# Patient Record
Sex: FEMALE
[De-identification: names, ages, dates, MRNs, and addresses within clinical notes are randomized per-mention and may not be internally consistent; named-entity substitution may affect disease eponyms.]

---

## 2017-03-15 ENCOUNTER — CHARTING TRANS (OUTPATIENT)
Dept: OTHER | Age: 52
End: 2017-03-15

## 2018-02-01 PROCEDURE — 84146 ASSAY OF PROLACTIN: CPT | Performed by: INTERNAL MEDICINE

## 2018-02-02 PROBLEM — E04.1 THYROID NODULE: Status: ACTIVE | Noted: 2018-02-02

## 2018-02-02 PROBLEM — G43.109 MIGRAINE WITH AURA AND WITHOUT STATUS MIGRAINOSUS, NOT INTRACTABLE: Status: ACTIVE | Noted: 2018-02-02

## 2018-11-05 VITALS
DIASTOLIC BLOOD PRESSURE: 80 MMHG | HEART RATE: 64 BPM | SYSTOLIC BLOOD PRESSURE: 142 MMHG | RESPIRATION RATE: 16 BRPM | WEIGHT: 108.02 LBS | HEIGHT: 62 IN | TEMPERATURE: 97.1 F | BODY MASS INDEX: 19.88 KG/M2

## 2020-12-10 ENCOUNTER — APPOINTMENT (OUTPATIENT)
Dept: MRI IMAGING | Facility: HOSPITAL | Age: 55
End: 2020-12-10
Attending: EMERGENCY MEDICINE
Payer: COMMERCIAL

## 2020-12-10 ENCOUNTER — HOSPITAL ENCOUNTER (EMERGENCY)
Facility: HOSPITAL | Age: 55
Discharge: HOME | End: 2020-12-10
Attending: EMERGENCY MEDICINE
Payer: COMMERCIAL

## 2020-12-10 VITALS
WEIGHT: 101.44 LBS | DIASTOLIC BLOOD PRESSURE: 70 MMHG | HEIGHT: 61 IN | HEART RATE: 64 BPM | SYSTOLIC BLOOD PRESSURE: 126 MMHG | RESPIRATION RATE: 16 BRPM | OXYGEN SATURATION: 100 % | BODY MASS INDEX: 19.15 KG/M2 | TEMPERATURE: 98 F

## 2020-12-10 DIAGNOSIS — G43.109 MIGRAINE WITH AURA AND WITHOUT STATUS MIGRAINOSUS, NOT INTRACTABLE: ICD-10-CM

## 2020-12-10 DIAGNOSIS — H53.9 VISION CHANGES: Primary | ICD-10-CM

## 2020-12-10 PROCEDURE — 99285 EMERGENCY DEPT VISIT HI MDM: CPT

## 2020-12-10 PROCEDURE — 70553 MRI BRAIN STEM W/O & W/DYE: CPT | Performed by: EMERGENCY MEDICINE

## 2020-12-10 PROCEDURE — 93010 ELECTROCARDIOGRAM REPORT: CPT

## 2020-12-10 PROCEDURE — 80053 COMPREHEN METABOLIC PANEL: CPT | Performed by: EMERGENCY MEDICINE

## 2020-12-10 PROCEDURE — 70549 MR ANGIOGRAPH NECK W/O&W/DYE: CPT | Performed by: EMERGENCY MEDICINE

## 2020-12-10 PROCEDURE — 99284 EMERGENCY DEPT VISIT MOD MDM: CPT

## 2020-12-10 PROCEDURE — A9575 INJ GADOTERATE MEGLUMI 0.1ML: HCPCS | Performed by: EMERGENCY MEDICINE

## 2020-12-10 PROCEDURE — 93005 ELECTROCARDIOGRAM TRACING: CPT

## 2020-12-10 PROCEDURE — 70546 MR ANGIOGRAPH HEAD W/O&W/DYE: CPT | Performed by: EMERGENCY MEDICINE

## 2020-12-10 PROCEDURE — 85025 COMPLETE CBC W/AUTO DIFF WBC: CPT | Performed by: EMERGENCY MEDICINE

## 2020-12-10 PROCEDURE — 36415 COLL VENOUS BLD VENIPUNCTURE: CPT

## 2020-12-10 RX ORDER — ASPIRIN 325 MG
325 TABLET ORAL ONCE
COMMUNITY
End: 2020-12-21

## 2020-12-10 NOTE — ED PROVIDER NOTES
Patient Seen in: [facility] Emergency Department      History   Patient presents with:  Headache  Blurred Vision    Stated Complaint: HA, blurred vision. Hypertensive yesterday.      HPI    Patient is pleasant 61-year-old woman coming for evaluation Physical Exam  Vitals signs and nursing note reviewed. Constitutional:       General: She is not in acute distress. Appearance: She is well-developed. She is not toxic-appearing. HENT:      Head: Normocephalic and atraumatic.    Eyes:      G results for these tests on the individual orders. CBC W/ DIFFERENTIAL     EKG    Rate, intervals and axes as noted on EKG Report.   Rate: 61  Rhythm: Sinus Rhythm  Reading: normal ekg                 MRI BRAIN MRA HEAD+MRA NECK (ALL W+WO) (CPT=70553/06776 internal carotid arteries to the     termini. Both vertebral arteries are patent. The left is slightly dominant in     caliber. There is smooth tapering. The basilar artery is patent with     smooth tapering.            No focal stenosis or dis Discharge Medication List

## 2020-12-21 ENCOUNTER — OFFICE VISIT (OUTPATIENT)
Dept: NEUROLOGY | Facility: CLINIC | Age: 55
End: 2020-12-21
Payer: COMMERCIAL

## 2020-12-21 VITALS
SYSTOLIC BLOOD PRESSURE: 118 MMHG | HEIGHT: 61 IN | BODY MASS INDEX: 19.07 KG/M2 | WEIGHT: 101 LBS | HEART RATE: 64 BPM | DIASTOLIC BLOOD PRESSURE: 68 MMHG | RESPIRATION RATE: 16 BRPM

## 2020-12-21 DIAGNOSIS — G43.709 CHRONIC MIGRAINE W/O AURA, NOT INTRACTABLE, W/O STAT MIGR: Primary | ICD-10-CM

## 2020-12-21 PROCEDURE — 3078F DIAST BP <80 MM HG: CPT | Performed by: OTHER

## 2020-12-21 PROCEDURE — 3074F SYST BP LT 130 MM HG: CPT | Performed by: OTHER

## 2020-12-21 PROCEDURE — 99204 OFFICE O/P NEW MOD 45 MIN: CPT | Performed by: OTHER

## 2020-12-21 PROCEDURE — 1111F DSCHRG MED/CURRENT MED MERGE: CPT | Performed by: OTHER

## 2020-12-21 PROCEDURE — 3008F BODY MASS INDEX DOCD: CPT | Performed by: OTHER

## 2020-12-21 RX ORDER — SUMATRIPTAN 50 MG/1
TABLET, FILM COATED ORAL
Qty: 9 TABLET | Refills: 0 | Status: SHIPPED | OUTPATIENT
Start: 2020-12-21 | End: 2021-01-18

## 2020-12-21 NOTE — H&P
[facility] New Patient / Consult Visit    Sean Carrillo is a 50year old female. Referring MD: No ref.  provider found    Patient presents with:  Hospital F/U: Vision issues and headaches      HPI:    Tana • SUMAtriptan Succinate 50 MG Oral Tab Use at onset; repeat once after 2 hours-ONLY 2 IN 24 HR MAX. This is a 30 day supply. 9 tablet 0          ROS:   A comprehensive 10 point review of systems was completed.   Pertinent positives and negatives noted in normal    Sensory:  Pin is normal  Vibration is normal  Proprioception is normal  Romberg is absent    Coordination:  Finger to nose normal bilaterally  Rapid alternating movements normal bilaterally  Heel to shin is normal bilaterally    DTRs:   2+, symme neck MRA. 4. Continued clinical correlation recommended.       IMPRESSION AND PLAN:   Noemi Medina is a 50year old female with past medical history significant for hyperlipidemia, as well as migraines, who presents for evaluation of headaches, in th Succinate 50 MG Oral Tab       As noted above     Return in about 3 months (around 3/21/2021).     Copy of note was sent to PCP    40 total minutes spent with patient >50% of visit was spent in counseling and coordination of care, including discussion of mi

## 2021-01-15 DIAGNOSIS — G43.709 CHRONIC MIGRAINE W/O AURA, NOT INTRACTABLE, W/O STAT MIGR: ICD-10-CM

## 2021-01-15 NOTE — TELEPHONE ENCOUNTER
Medication: imitrex    Date of last refill: 12/21/20  [place]    Last office visit: 12/21/20  Due back to clinic per last office note:  3 months  Date next office visit scheduled:    Future Appointments   Date Time Pr

## 2021-01-18 RX ORDER — SUMATRIPTAN 50 MG/1
TABLET, FILM COATED ORAL
Qty: 9 TABLET | Refills: 0 | Status: SHIPPED | OUTPATIENT
Start: 2021-01-18 | End: 2021-02-11

## 2021-01-18 RX ORDER — SUMATRIPTAN 50 MG/1
TABLET, FILM COATED ORAL
Qty: 9 TABLET | Refills: 0 | OUTPATIENT
Start: 2021-01-18

## 2021-02-11 DIAGNOSIS — G43.709 CHRONIC MIGRAINE W/O AURA, NOT INTRACTABLE, W/O STAT MIGR: ICD-10-CM

## 2021-02-11 RX ORDER — SUMATRIPTAN 50 MG/1
TABLET, FILM COATED ORAL
Qty: 9 TABLET | Refills: 2 | Status: SHIPPED | OUTPATIENT
Start: 2021-02-11 | End: 2021-03-25

## 2021-02-11 NOTE — TELEPHONE ENCOUNTER
[facility] refill    Send to:  [facility] [facility], pt has questions about usage and amounts taken each week.   Call pt

## 2021-02-11 NOTE — TELEPHONE ENCOUNTER
Medication: Imitrex    Date of last refill: 1/18/2021 (#9/0)  [place]    Last office visit: 12/20/2020  Due back to clinic per last office note:  RTC in 3 months  Date next office visit scheduled: therapy to maximize effectiveness, and risk of medication overuse headache.     Patient was additionally advised to keep a headache diary, detailing migraine triggers, alleviating factors, response to medication, as well as frequency/severity and type of h

## 2021-03-17 ENCOUNTER — TELEPHONE (OUTPATIENT)
Dept: NEUROLOGY | Facility: CLINIC | Age: 56
End: 2021-03-17

## 2021-03-25 ENCOUNTER — OFFICE VISIT (OUTPATIENT)
Dept: NEUROLOGY | Facility: CLINIC | Age: 56
End: 2021-03-25
Payer: COMMERCIAL

## 2021-03-25 VITALS
SYSTOLIC BLOOD PRESSURE: 102 MMHG | HEART RATE: 72 BPM | DIASTOLIC BLOOD PRESSURE: 58 MMHG | BODY MASS INDEX: 20 KG/M2 | RESPIRATION RATE: 16 BRPM | WEIGHT: 105 LBS

## 2021-03-25 DIAGNOSIS — G43.709 CHRONIC MIGRAINE W/O AURA, NOT INTRACTABLE, W/O STAT MIGR: ICD-10-CM

## 2021-03-25 PROCEDURE — 3074F SYST BP LT 130 MM HG: CPT | Performed by: OTHER

## 2021-03-25 PROCEDURE — 99214 OFFICE O/P EST MOD 30 MIN: CPT | Performed by: OTHER

## 2021-03-25 PROCEDURE — 3078F DIAST BP <80 MM HG: CPT | Performed by: OTHER

## 2021-03-25 RX ORDER — TOPIRAMATE 25 MG/1
TABLET ORAL
Qty: 120 TABLET | Refills: 2 | Status: SHIPPED | OUTPATIENT
Start: 2021-03-25 | End: 2021-06-04 | Stop reason: DRUGHIGH

## 2021-03-25 RX ORDER — SUMATRIPTAN 50 MG/1
TABLET, FILM COATED ORAL
Qty: 9 TABLET | Refills: 2 | Status: SHIPPED | OUTPATIENT
Start: 2021-03-25 | End: 2021-06-04

## 2021-03-25 NOTE — PROGRESS NOTES
Pt here for migraine follow up. Pt feels she has been getting migraines more frequently, but abortive medication works well.

## 2021-03-25 NOTE — PROGRESS NOTES
[facility] Progress Note    HPI  Patient presents with:  Migraine      As per my initial H&P from 12/21/2020  \" Sean Carrillo is a 50year old, who presents for evaluation of headache and vision issues.       Patient states she has taking 10-11 times a month; denies any side effects on the medication. Past Medical History:   Diagnosis Date   • Hyperlipidemia    • Migraine    • Thyroid nodule      History reviewed. No pertinent surgical history.   Family History   Problem Relation 2  •  topiramate 25 MG Oral Tab, start at 25 mg nightly x1 week, then increase to 50 mg nightly x1 week, then 25 mg AM / 50 mg PM x1 week, then 50 mg bid, Disp: 120 tablet, Rfl: 2    Review of Systems:  No chest pain or palpitations; otherwise as noted in mastoid air cells have expected signal.       There is a typical take up pattern of the great vessels from the transverse arch.  The origins appear widely patent.  There is smooth tapering of the common carotid arteries to the bifurcations.  The bifurcatio topiramate (Topamax) as follows:  start at 25 mg nightly x1 week, then increase to 50 mg nightly x1 week, then 25 mg AM / 50 mg PM x1 week, then 50 mg bid; advised of potential side effects, including but not limited to, cognitive slowing / word finding di

## 2021-06-04 ENCOUNTER — OFFICE VISIT (OUTPATIENT)
Dept: NEUROLOGY | Facility: CLINIC | Age: 56
End: 2021-06-04
Payer: COMMERCIAL

## 2021-06-04 VITALS
WEIGHT: 103 LBS | HEIGHT: 61 IN | HEART RATE: 68 BPM | DIASTOLIC BLOOD PRESSURE: 74 MMHG | RESPIRATION RATE: 16 BRPM | SYSTOLIC BLOOD PRESSURE: 118 MMHG | BODY MASS INDEX: 19.45 KG/M2

## 2021-06-04 DIAGNOSIS — G43.709 CHRONIC MIGRAINE W/O AURA, NOT INTRACTABLE, W/O STAT MIGR: Primary | ICD-10-CM

## 2021-06-04 PROCEDURE — 3078F DIAST BP <80 MM HG: CPT | Performed by: OTHER

## 2021-06-04 PROCEDURE — 99213 OFFICE O/P EST LOW 20 MIN: CPT | Performed by: OTHER

## 2021-06-04 PROCEDURE — 3074F SYST BP LT 130 MM HG: CPT | Performed by: OTHER

## 2021-06-04 PROCEDURE — 3008F BODY MASS INDEX DOCD: CPT | Performed by: OTHER

## 2021-06-04 RX ORDER — SUMATRIPTAN 50 MG/1
TABLET, FILM COATED ORAL
Qty: 9 TABLET | Refills: 2 | Status: SHIPPED | OUTPATIENT
Start: 2021-06-04 | End: 2021-09-29

## 2021-06-04 RX ORDER — TOPIRAMATE 25 MG/1
25 TABLET ORAL 2 TIMES DAILY
Qty: 60 TABLET | Refills: 0 | Status: SHIPPED | OUTPATIENT
Start: 2021-06-04 | End: 2021-06-29

## 2021-06-04 RX ORDER — TOPIRAMATE 50 MG/1
50 TABLET, FILM COATED ORAL 2 TIMES DAILY
COMMUNITY
End: 2021-06-04 | Stop reason: DRUGHIGH

## 2021-06-04 NOTE — PROGRESS NOTES
[facility] Progress Note    HPI  Patient presents with:  Migraine: Follow up      As per my initial H&P from 12/21/2020  \" Osmany Waldrop is a 50year old, who presents for evaluation of headache and vision issues.       Patient sta dosing and worsened in the past month. Past Medical History:   Diagnosis Date   • Hyperlipidemia    • Migraine    • Thyroid nodule      History reviewed. No pertinent surgical history.   Family History   Problem Relation Age of Onset   • Other (thyroid PERRL, EOMI with no nystagmus, VFF, smile symmetric, sensation intact, tongue and palate midline, SCM intact, otherwise, CN 2-12 intact  Motor: 5/5 strength throughout, tone normal  DTR: 2+ symmetric throughout, toes downgoing bilaterally, no clonus  Senso    1. No acute intracranial abnormality. 2. Unremarkable [id number] MRA. 3. Unremarkable neck MRA. 4. Continued clinical correlation recommended.             Impression/ Plan:  Hitesh Hawley is a 50year old female with past medical history [facility] [id number] [facility]  Pager [phone]  6/4/2021

## 2021-06-29 DIAGNOSIS — G43.709 CHRONIC MIGRAINE W/O AURA, NOT INTRACTABLE, W/O STAT MIGR: ICD-10-CM

## 2021-06-29 RX ORDER — TOPIRAMATE 25 MG/1
TABLET ORAL
Qty: 60 TABLET | Refills: 1 | Status: SHIPPED | OUTPATIENT
Start: 2021-06-29 | End: 2021-08-06

## 2021-06-29 NOTE — TELEPHONE ENCOUNTER
Medication: Topiramate 25 mg  Date of last refill:06/04/21  [place]     Last office visit: 06/04/21  Due back to clinic per last office note: RTN in 2 months  Date next office visit scheduled:    Future Appointments   Da intractable, w/o stat migr  (primary encounter diagnosis)  Plan: topiramate 25 MG Oral Tab, SUMAtriptan         Succinate 50 MG Oral Tab        As noted above      Return in about 2 months (around 8/4/2021).

## 2021-08-06 ENCOUNTER — OFFICE VISIT (OUTPATIENT)
Dept: NEUROLOGY | Facility: CLINIC | Age: 56
End: 2021-08-06
Payer: COMMERCIAL

## 2021-08-06 VITALS
DIASTOLIC BLOOD PRESSURE: 78 MMHG | HEIGHT: 61 IN | BODY MASS INDEX: 19.26 KG/M2 | RESPIRATION RATE: 16 BRPM | HEART RATE: 74 BPM | SYSTOLIC BLOOD PRESSURE: 112 MMHG | WEIGHT: 102 LBS

## 2021-08-06 DIAGNOSIS — G43.709 CHRONIC MIGRAINE W/O AURA, NOT INTRACTABLE, W/O STAT MIGR: Primary | ICD-10-CM

## 2021-08-06 PROCEDURE — 99213 OFFICE O/P EST LOW 20 MIN: CPT | Performed by: OTHER

## 2021-08-06 PROCEDURE — 3008F BODY MASS INDEX DOCD: CPT | Performed by: OTHER

## 2021-08-06 PROCEDURE — 3078F DIAST BP <80 MM HG: CPT | Performed by: OTHER

## 2021-08-06 PROCEDURE — 3074F SYST BP LT 130 MM HG: CPT | Performed by: OTHER

## 2021-08-06 RX ORDER — TOPIRAMATE 25 MG/1
25 TABLET ORAL 2 TIMES DAILY
Qty: 60 TABLET | Refills: 2 | Status: SHIPPED | OUTPATIENT
Start: 2021-08-06

## 2021-09-29 DIAGNOSIS — G43.709 CHRONIC MIGRAINE W/O AURA, NOT INTRACTABLE, W/O STAT MIGR: ICD-10-CM

## 2021-09-29 RX ORDER — SUMATRIPTAN 50 MG/1
TABLET, FILM COATED ORAL
Qty: 9 TABLET | Refills: 2 | Status: SHIPPED | OUTPATIENT
Start: 2021-09-29

## 2021-09-29 NOTE — TELEPHONE ENCOUNTER
Medication: Sumatriptan 50 mg    Date of last refill:06/04/21 with 2 addt refills  [place]   Last office visit: 08/06/21  Due back to clinic per last office note:  RTN in 3 months  Date next office visit scheduled:     Fu

## (undated) NOTE — LETTER
12/23/20    Dear Dr. Mark Castaneda MD        I saw your patient, Fabien Mcgill for an initial visit. Please see my H&P note enclosed below. Let me know if you have any questions.     Thank you  Ruth Swenson MD, Neurology  [address] No past surgical history on file.   Social History    Tobacco Use      Smoking status: Never Smoker      Smokeless tobacco: Never Used    Alcohol use: No    Drug use: No    Family History   Problem Relation Age of Onset   • Other (thyroid disorder) Sister Smile symmetric, eyebrow raise symmetric  Vestibulocochlear:   Hearing: normal bilaterally  Glossopharyngeal/Vagus:   Palate elevates symmetrically with midline uvula  Spinal accessory:   Shoulder Shrug: normal bilaterally   Lateral head turn: normal bila No focal stenosis or dissection of the carotid or vertebral basilar systems. The anterior, middle and posterior cerebral arteries are patent. There are fetal type posterior communicating arteries bilaterally, anatomic variation.   There correspondi Patient was counseled regarding conservative management, stress reduction techniques, moist heat, massage, and OTC anti-inflammatory medications as needed; also discussed optimal timing of migraine specific medications for abortive therapy to maximize e